# Patient Record
Sex: FEMALE | Race: BLACK OR AFRICAN AMERICAN | NOT HISPANIC OR LATINO | URBAN - METROPOLITAN AREA
[De-identification: names, ages, dates, MRNs, and addresses within clinical notes are randomized per-mention and may not be internally consistent; named-entity substitution may affect disease eponyms.]

---

## 2022-07-14 DIAGNOSIS — M25.571 RIGHT ANKLE PAIN, UNSPECIFIED CHRONICITY: Primary | ICD-10-CM

## 2022-07-21 ENCOUNTER — HOSPITAL ENCOUNTER (OUTPATIENT)
Dept: RADIOLOGY | Facility: HOSPITAL | Age: 32
Discharge: HOME | End: 2022-07-21
Attending: STUDENT IN AN ORGANIZED HEALTH CARE EDUCATION/TRAINING PROGRAM
Payer: MEDICAID

## 2022-07-21 ENCOUNTER — OFFICE VISIT (OUTPATIENT)
Dept: ORTHOPEDICS | Facility: CLINIC | Age: 32
End: 2022-07-21
Payer: MEDICAID

## 2022-07-21 VITALS
SYSTOLIC BLOOD PRESSURE: 108 MMHG | WEIGHT: 256 LBS | RESPIRATION RATE: 16 BRPM | OXYGEN SATURATION: 96 % | HEIGHT: 63 IN | HEART RATE: 93 BPM | DIASTOLIC BLOOD PRESSURE: 75 MMHG | BODY MASS INDEX: 45.36 KG/M2

## 2022-07-21 DIAGNOSIS — S93.491A SPRAIN OF ANTERIOR TALOFIBULAR LIGAMENT OF RIGHT ANKLE, INITIAL ENCOUNTER: Primary | ICD-10-CM

## 2022-07-21 DIAGNOSIS — M25.571 RIGHT ANKLE PAIN, UNSPECIFIED CHRONICITY: ICD-10-CM

## 2022-07-21 DIAGNOSIS — M76.829 POSTERIOR TIBIALIS TENDON INSUFFICIENCY: ICD-10-CM

## 2022-07-21 PROCEDURE — 99213 OFFICE O/P EST LOW 20 MIN: CPT | Mod: PBBFAC

## 2022-07-21 PROCEDURE — 73610 X-RAY EXAM OF ANKLE: CPT | Mod: TC,RT

## 2022-07-21 RX ORDER — MELOXICAM 7.5 MG/1
7.5 TABLET ORAL DAILY PRN
COMMUNITY
Start: 2022-07-11

## 2022-07-21 RX ORDER — ALBUTEROL SULFATE 0.63 MG/3ML
1 SOLUTION RESPIRATORY (INHALATION) EVERY 6 HOURS PRN
COMMUNITY

## 2022-07-21 NOTE — PROGRESS NOTES
Subjective:          Chief Complaint: Carmen Shen is a 31 y.o. female who had concerns including Pain of the Right Ankle.    HPI    31 y.o. female presents to clinic with complaint of Right Ankle pain.     Patient had had recurrent ankle sprains over the last several years.  Patient noted she had an inversion injury approximately 2 weeks ago and had an xray that showed no fracture at that time. She was referred here for further evaluation. No new falls or trauma since last seen. Has noted some improvement since injury.     Onset: as per hpi   Location: lateral   Mechanism of injury: Inversion  without Instability   Current pain level:  5/10 moderate  and quality described as Sharp that is Improved with anti-inflammatory medications.   Modifying factors: Worse with activity and improved on rest. Stiffness worsened with activity and immobilization    Previous Injury: as per hpi   Previous Tx: Has not tried Functional Ankle brace  Has not tried PT/HEP  Strengthening, ROM and Neuromuscular training exercises   Associated Sxs: Swelling only  No weakness. . No Decreased ROM     Activity level: Sedentary and Full ADLs   Family Hx: OA   PCP: Primary Doctor No   Employment: Employed full time      ROS  As per hpi               Objective:        Vitals:    07/21/22 1122   BP: 108/75   Pulse: 93   Resp: 16         General: Carmen is well-developed, well-nourished, appears stated age, in no acute distress, alert and oriented to time, place and person.             Right Ankle/Foot Exam     Swelling   The patient is swollen on the anterior talofibular ligament.    Tenderness   The patient is tender to palpation of the ATF.    Pain   The patient exhibits pain of the anterior talofibular ligament.    Range of Motion   The patient has normal right ankle ROM.    Alignment   Hindfoot Alignment: valgus  Midfoot Alignment: normal  Forefoot alignment: varus.    Muscle Strength   The patient has normal right ankle strength.    Tests    Anterior drawer: negative  Varus tilt: negative  Heel Walk: able to perform  Tiptoe Walk: able to perform  Single Heel Rise: able to perform  External Rotation Test: negative  Squeeze Test: negative    Other   Sensation: normal    Comments:  Able to do single heel raise but with difficulty.     Left Ankle/Foot Exam   Left ankle exam is normal.    Range of Motion   The patient has normal left ankle ROM.     Alignment   Hindfoot Alignment: neutral  Midfoot Alignment: normal  Forefoot Alignment: normal    Muscle Strength   The patient has normal left ankle strength.    Tests   Anterior drawer: negative  Varus tilt: negative  Heel Walk: able to perform  Tiptoe Walk: able to perform  Single Heel Rise: able to perform  External Rotation Test: negative  Squeeze Test: absent    Other   Sensation: normal      Xray of the right ankle from today with my interpration as follows: no acute fracture, dislocation or misalignment. tibiotalar and medial clear space normal. Soft tissue swelling along lateral malleolus.         Assessment:       Encounter Diagnoses   Name Primary?    Sprain of anterior talofibular ligament of right ankle, initial encounter Yes    Posterior tibialis tendon insufficiency            Plan:       Dx: Right ATFL sprain and posterior tibialis tendon insuffiency . Acute in moderate exacerbation. Discussed with patient diagnosis and treatment recommendations. Handout given.   Imaging: radiological studies ordered and independently reviewed; discussed with patient; radiologist interpretation pending .   Treatment Plan: Functional ankle brace. NSAIDs activity modification. PT x 12 weeks. Begin ROM and proprioceptive exercises when pain improves. Consider MRI of the ankle in 3 months if no improvement.   Weight Management: is paramount. As BMI is >35, recommend discuss with PCP about bariatric surgery options.   Procedure: Discussed CSI/VSI as treatment options; patient not a candidate for CSI/VSI.   Activity:  Activity as tolerated; HEP to include aerobic conditioning and strength training with non-painful activity. ROM/STG exercises. Proper footware; assistive devises to avoid limping.   Medication: First line treatment with daily Acetaminophen 1000mg TID; medication precautions given .   RTC: PRN; As needed.                        Patient questionnaires may have been collected.

## 2022-07-22 NOTE — PROGRESS NOTES
Faculty Attestation: Carmen Shen  was seen in [facility]. Discussed with Dr. Mckinley at the time of the visit. History of Present Illness, Physical Exam, and Assessment and Plan reviewed. Treatment plan is reasonable and appropriate. Compliance with treatment recommendations is important.  Radiology images independently reviewed and agree with fellow interpretation.  No procedure was performed.     David Bonilla MD

## 2025-04-02 ENCOUNTER — OFFICE VISIT (OUTPATIENT)
Dept: FAMILY MEDICINE | Facility: CLINIC | Age: 35
End: 2025-04-02
Payer: COMMERCIAL

## 2025-04-02 VITALS
TEMPERATURE: 98 F | BODY MASS INDEX: 39.69 KG/M2 | SYSTOLIC BLOOD PRESSURE: 108 MMHG | OXYGEN SATURATION: 99 % | WEIGHT: 224 LBS | RESPIRATION RATE: 18 BRPM | HEART RATE: 85 BPM | DIASTOLIC BLOOD PRESSURE: 75 MMHG | HEIGHT: 63 IN

## 2025-04-02 DIAGNOSIS — Z00.00 WELLNESS EXAMINATION: Primary | ICD-10-CM

## 2025-04-02 DIAGNOSIS — E66.812 CLASS 2 OBESITY DUE TO EXCESS CALORIES WITHOUT SERIOUS COMORBIDITY WITH BODY MASS INDEX (BMI) OF 39.0 TO 39.9 IN ADULT: ICD-10-CM

## 2025-04-02 DIAGNOSIS — J30.9 ALLERGIC RHINITIS, UNSPECIFIED SEASONALITY, UNSPECIFIED TRIGGER: ICD-10-CM

## 2025-04-02 DIAGNOSIS — R20.0 NUMBNESS AND TINGLING OF UPPER EXTREMITY: ICD-10-CM

## 2025-04-02 DIAGNOSIS — M79.604 BILATERAL LEG PAIN: ICD-10-CM

## 2025-04-02 DIAGNOSIS — E66.09 CLASS 2 OBESITY DUE TO EXCESS CALORIES WITHOUT SERIOUS COMORBIDITY WITH BODY MASS INDEX (BMI) OF 39.0 TO 39.9 IN ADULT: ICD-10-CM

## 2025-04-02 DIAGNOSIS — Z23 NEED FOR DIPHTHERIA-TETANUS-PERTUSSIS (TDAP) VACCINE: ICD-10-CM

## 2025-04-02 DIAGNOSIS — M79.605 BILATERAL LEG PAIN: ICD-10-CM

## 2025-04-02 DIAGNOSIS — R41.840 ATTENTION AND CONCENTRATION DEFICIT: ICD-10-CM

## 2025-04-02 DIAGNOSIS — R20.2 NUMBNESS AND TINGLING OF UPPER EXTREMITY: ICD-10-CM

## 2025-04-02 DIAGNOSIS — K21.9 GASTROESOPHAGEAL REFLUX DISEASE, UNSPECIFIED WHETHER ESOPHAGITIS PRESENT: ICD-10-CM

## 2025-04-02 DIAGNOSIS — J45.20 MILD INTERMITTENT ASTHMA WITHOUT COMPLICATION: ICD-10-CM

## 2025-04-02 RX ORDER — TIRZEPATIDE 7.5 MG/.5ML
7.5 INJECTION, SOLUTION SUBCUTANEOUS
Qty: 2 ML | Refills: 2 | Status: SHIPPED | OUTPATIENT
Start: 2025-04-02 | End: 2025-07-01

## 2025-04-02 RX ORDER — CETIRIZINE HYDROCHLORIDE 10 MG/1
1 TABLET ORAL EVERY MORNING
COMMUNITY

## 2025-04-02 RX ORDER — ONDANSETRON 4 MG/1
4 TABLET, ORALLY DISINTEGRATING ORAL EVERY 6 HOURS PRN
COMMUNITY
Start: 2025-03-16

## 2025-04-02 RX ORDER — ASPIRIN 325 MG
50000 TABLET, DELAYED RELEASE (ENTERIC COATED) ORAL
COMMUNITY
Start: 2025-03-16

## 2025-04-02 RX ORDER — OMEPRAZOLE 40 MG/1
40 CAPSULE, DELAYED RELEASE ORAL 2 TIMES DAILY
COMMUNITY

## 2025-04-02 RX ORDER — BUDESONIDE AND FORMOTEROL FUMARATE DIHYDRATE 160; 4.5 UG/1; UG/1
2 AEROSOL RESPIRATORY (INHALATION) 2 TIMES DAILY
COMMUNITY

## 2025-04-02 RX ORDER — BUSPIRONE HYDROCHLORIDE 5 MG/1
5 TABLET ORAL 2 TIMES DAILY
COMMUNITY

## 2025-04-02 NOTE — LETTER
AUTHORIZATION FOR RELEASE OF   CONFIDENTIAL INFORMATION    Dear Dr. guardado,    We are seeing Carmen Shen, date of birth 1990, in the clinic at No Department Specified. No, Primary Doctor is the patient's PCP. Carmen Shen has an outstanding lab/procedure at the time we reviewed her chart. In order to help keep her health information updated, she has authorized us to request the following medical record(s):        (  )  MAMMOGRAM                                      (  )  COLONOSCOPY      ( xx )  PAP SMEAR                                          (  )  OUTSIDE LAB RESULTS     (  )  DEXA SCAN                                          (  )  EYE EXAM            (  )  FOOT EXAM                                          (  )  ENTIRE RECORD     (  )  OUTSIDE IMMUNIZATIONS                 (  )  _______________         Please fax records to [facility], Cassandra Pillette MD, [phone]     If you have any questions, please contact Jessie Cisse LPN at [phone].           Patient Name: Carmen Shen  : 1990  Patient Phone #: [phone]

## 2025-04-02 NOTE — PROGRESS NOTES
Patient ID: [id number]     Chief Complaint: Establish Care and Annual Exam        HPI:     Carmen Shen is a 34 y.o. female here today to establish care with PCP and for annual wellness exam.  The patient presents for well adult exam.    The patient's general health status is described as good.    The patient's diet is described as balanced.    Exercise: occasional.    Associated symptoms consist of denies weight loss, denies weight gain, denies fatigue, denies headache, denies hearing loss and denies vision changes.    Additional pertinent history: last dental exam: > 6 months (she has dental insurance, she will schedule an appointment next available, last eye exam: > 1 year (wears eyeglasses, she needs referral for eye exam) Denies tobacco use. Social alcohol use.   She is due for annual labs today. She would like HIV and Hep C screening.   LMP: 03/26/2025, regular.  Cervical Cancer Screening - Last Pap 2024. Follow up with GYN Clinic (Dr. Dale in [place]) for Pap/Pelvic.    Vaccinations: COVID- Not interested / Tetanus - Would like/ Flu vaccine- not interested    - She is obese and has lost 60 pounds with compound tirzepatide 7.5 mg weekly, no side effects, would like to see if her insurance covers the brand name, she reports mild snoring, no witnessed apnea, she is not interested in seeing a dietician.     - GERD is controlled with Rx, followed by GI at [facility], asymptomatic.     - Mild intermittent asthma, asymptomatic, stable with Rx, no side effects, she has Rx at home, no asthma attack in over 1 year.     - She has allergic rhinitis, stable with Rx, would like allergy testing done.     - Anxiety is stable with Rx Buspirone p.r.n., no side effects, asymptomatic.     - She reports difficulty focusing and concentrating on tasks at work and at home x several years, she would like testing for ADD.     - She reports bilateral leg pain and cramping x several months, would like venous testing,  denies injury other than a TV falling on her right leg 2 weeks ago.     - She reports bilateral UE paraesthesia x several months, she is right hand dominant, she works as [employer], she has had XR C-spine with previous provider that was normal, she hasn't had UE NCS/EMG.     - Patient is without any other complaints today.       Advance Care Planning     Date: 04/02/2025  Patient did not wish or was not able to name a surrogate decision maker or provide an Advance Care Plan.        -------------------------------------    GERD (gastroesophageal reflux disease)    Known health problems: none        Past Surgical History:   Procedure Laterality Date    NO PAST SURGERIES         Review of patient's allergies indicates:  No Known Allergies    Outpatient Medications Marked as Taking for the 4/2/25 encounter (Office Visit) with Luana Plummer MD   Medication Sig Dispense Refill    albuterol (ACCUNEB) 0.63 mg/3 mL Nebu Inhale 1 ampule into the lungs every 6 (six) hours as needed.      budesonide-formoterol 160-4.5 mcg (SYMBICORT) 160-4.5 mcg/actuation HFAA Inhale 2 puffs into the lungs 2 (two) times daily.      busPIRone (BUSPAR) 5 MG Tab Take 5 mg by mouth 2 (two) times daily. (Patient taking differently: Take 5 mg by mouth 2 (two) times daily as needed.)      cetirizine (ZYRTEC) 10 MG tablet Take 1 tablet by mouth every morning.      cholecalciferol, vitamin D3, 1,250 mcg (50,000 unit) capsule Take 50,000 Units by mouth every 7 days.      meloxicam (MOBIC) 7.5 MG tablet Take 7.5 mg by mouth daily as needed.      omeprazole (PRILOSEC) 40 MG capsule Take 40 mg by mouth 2 (two) times daily.      ondansetron (ZOFRAN-ODT) 4 MG TbDL Take 4 mg by mouth every 6 (six) hours as needed.       Current Facility-Administered Medications for the 4/2/25 encounter (Office Visit) with Luana Plummer MD   Medication Dose Route Frequency Provider Last Rate Last Admin    DIPH,PERTUSS(ACEL),TET VAC(PF)(ADULT)(ADACEL)(TDaP)  0.5 mL  "Intramuscular 1 time in Clinic/HOD            Social History[1]     Family History   Problem Relation Name Age of Onset    No Known Problems Mother      Diabetes Father      No Known Problems Sister      No Known Problems Brother          Subjective:       Review of Systems:    See HPI for details    Constitutional: No fever, No chills, No fatigue.   Eye: No blurring, No visual disturbances.   Ear/Nose/Mouth/Throat: No decreased hearing, No ear pain, No nasal congestion, No sore throat.   Respiratory: No shortness of breath, No cough, No wheezing.   Cardiovascular: No chest pain, No palpitations, No peripheral edema.   Breast: Both breasts, No lump/ mass, No pain.   Nipple discharge: None.   Gastrointestinal: No nausea, No vomiting, No diarrhea, No constipation, No abdominal pain.   Genitourinary: No dysuria, No hematuria.   Gynecologic: Negative except as documented in history of present illness.   Hematology/Lymphatics: No bruising tendency, No bleeding tendency, No swollen lymph glands.   Endocrine: No excessive thirst, No polyuria, No excessive hunger.   Musculoskeletal: Reports joint pain, No muscle pain, No decreased range of motion.   Integumentary: No rash, No pruritus.   Neurologic: No abnormal balance, No confusion, No headache.   Psychiatric: As per HPI. No sleeping problems, No anxiety, No depression, Not suicidal, No hallucinations      All Other ROS: Negative except as stated in HPI.       Objective:     /75 (BP Location: Right arm, Patient Position: Sitting)   Pulse 85   Temp 97.8 °F (36.6 °C) (Oral)   Resp 18   Ht 5' 3" (1.6 m)   Wt 101.6 kg (224 lb)   LMP 03/26/2025 (Approximate)   SpO2 99%   BMI 39.68 kg/m²     Physical Exam    General: Alert and oriented, No acute distress.   Appearance: Obese.   Eye: Pupils are equal, round and reactive to light, Normal conjunctiva.   HENT: Normocephalic, Tympanic membranes are clear, Normal hearing, Oral mucosa is moist, No pharyngeal erythema. "   Throat: Pharynx ( Not edematous, No exudate ).   Neck: Supple, Non-tender, No carotid bruit, No lymphadenopathy, No thyromegaly.   Respiratory: Lungs are clear to auscultation, Respirations are non-labored, Breath sounds are equal, Symmetrical chest wall expansion, No chest wall tenderness.   Cardiovascular: Normal rate, Regular rhythm, No murmur, Good pulses equal in all extremities, No edema.   Gastrointestinal: Soft, Non-tender, Non-distended, Normal bowel sounds, No organomegaly, Abdomen.   Genitourinary: No costovertebral angle tenderness.   Musculoskeletal: Normal range of motion, No tenderness, Normal gait.   Neurologic: No focal deficits, Cranial Nerves II-XII are grossly intact.   Psychiatric: Cooperative, Appropriate mood & affect, Normal judgment, Non-suicidal.   Mood and affect: Calm.   Behavior: Relaxed         Assessment:       ICD-10-CM ICD-9-CM   1. Wellness examination  Z00.00 V70.0   2. Need for diphtheria-tetanus-pertussis (Tdap) vaccine  Z23 V06.1   3. Class 2 obesity due to excess calories without serious comorbidity with body mass index (BMI) of 39.0 to 39.9 in adult  E66.812 278.00    E66.09 V85.39    Z68.39    4. Gastroesophageal reflux disease, unspecified whether esophagitis present  K21.9 530.81   5. Mild intermittent asthma without complication  J45.20 493.90   6. Allergic rhinitis, unspecified seasonality, unspecified trigger  J30.9 477.9   7. Attention and concentration deficit  R41.840 799.51   8. Bilateral leg pain  M79.604 729.5    M79.605    9. Numbness and tingling of upper extremity  R20.0 782.0    R20.2         Plan:     Problem List Items Addressed This Visit          ENT    Allergic rhinitis    Relevant Orders    Allergen Respiratory Panel IgE - Region 6       Pulmonary    Mild intermittent asthma without complication       Endocrine    Class 2 obesity due to excess calories without serious comorbidity with body mass index (BMI) of 39.0 to 39.9 in adult    Relevant  Medications    tirzepatide, weight loss, (ZEPBOUND) 7.5 mg/0.5 mL PnIj    Other Relevant Orders    Vitamin D       GI    Gastroesophageal reflux disease     Other Visit Diagnoses         Wellness examination    -  Primary    Relevant Orders    Ambulatory referral/consult to Ophthalmology    CBC Auto Differential    Comprehensive Metabolic Panel    Hemoglobin A1C    Lipid Panel    TSH    Hepatitis C Antibody    HIV 1/2 Ag/Ab (4th Gen)    Urinalysis, Reflex to Urine Culture    Vitamin B12    Iron and TIBC    Ferritin    Folate      Need for diphtheria-tetanus-pertussis (Tdap) vaccine        Relevant Medications    DIPH,PERTUSS(ACEL),TET VAC(PF)(ADULT)(ADACEL)(TDaP) (Start on 4/2/2025 10:15 AM)      Attention and concentration deficit        Relevant Orders    Ambulatory referral/consult to Psychiatry      Bilateral leg pain        Relevant Orders    CV Ultrasound doppler venous legs bilat    CV US Lower Extremity Veins Bilateral Insufficiency    CV Ultrasound doppler arterial legs bilat      Numbness and tingling of upper extremity        Relevant Orders    Vitamin B12    Ambulatory referral/consult to Neurology         1. Wellness examination  - Ambulatory referral/consult to Ophthalmology; Future  - CBC Auto Differential; Future  - Comprehensive Metabolic Panel; Future  - Hemoglobin A1C; Future  - Lipid Panel; Future  - TSH; Future  - Hepatitis C Antibody; Future  - HIV 1/2 Ag/Ab (4th Gen); Future  - Urinalysis, Reflex to Urine Culture; Future  - Vitamin B12; Future  - Iron and TIBC; Future  - Ferritin; Future  - Folate; Future  - Will treat pending lab results. Monthly breast self exam encouraged. Diet, exercise, and 10% weight loss encouraged. Keep appointment for dental exams x q6 months as scheduled. Keep appointment for annual eye exam as scheduled. Keep appointment with GYN for annual pap smear as scheduled. Notify M.D. or ER if temp greater than 100.4, or any acute illness.      2. Need for  diphtheria-tetanus-pertussis (Tdap) vaccine  - DIPH,PERTUSS(ACEL),TET VAC(PF)(ADULT)(ADACEL)(TDaP) IM x 1 today.    3. Class 2 obesity due to excess calories without serious comorbidity with body mass index (BMI) of 39.0 to 39.9 in adult  - Vitamin D; Future  - Rx trial of tirzepatide, weight loss, (ZEPBOUND) 7.5 mg/0.5 mL PnIj; Inject 7.5 mg into the skin every 7 days.  Dispense: 2 mL; Refill: 2  - Diet, exercise, and 10% weight loss encouraged. Rx trial of Zepbound with close monitoring to help with obesity. She agrees to avoid pregnancy with Mounjaro. Will titrate Rx as needed/tolerated until max dose achieved. Notify M.D. or ER if symptoms persist or worsen, adverse Rx side effects, temp greater than 100.4, or any acute illness.      4. Gastroesophageal reflux disease, unspecified whether esophagitis present  - Stable, continue Prilosec and GERD dietary precautions.     5. Mild intermittent asthma without complication  - Asymptomatic, continue Symbicort daily and Albuterol p.r.n. Notify M.D. or ER if symptoms persist or worsen, SOB, chest tightness, asthma attack refractory to rescue inhaler, temp >100.4, or any acute illness.      6. Allergic rhinitis, unspecified seasonality, unspecified trigger  - Allergen Respiratory Panel IgE - Region 6; Future  - Continue Zyrtec. Will treat pending results.     7. Attention and concentration deficit  - Ambulatory referral/consult to Psychiatry; Future for ADD evaluation, will treat pending results.     8. Bilateral leg pain  - CV Ultrasound doppler venous legs bilat; Future  - CV US Lower Extremity Veins Bilateral Insufficiency; Future  - CV Ultrasound doppler arterial legs bilat; Future  - Will treat pending results.     9. Numbness and tingling of upper extremity  - Vitamin B12; Future  - Ambulatory referral/consult to Neurology; Future for evaluation for UE NCS/EMG; wear wrist splints/braces at night.         Carmen was seen today for establish care and annual  exam.    Diagnoses and all orders for this visit:    Wellness examination  -     Ambulatory referral/consult to Ophthalmology; Future  -     CBC Auto Differential; Future  -     Comprehensive Metabolic Panel; Future  -     Hemoglobin A1C; Future  -     Lipid Panel; Future  -     TSH; Future  -     Hepatitis C Antibody; Future  -     HIV 1/2 Ag/Ab (4th Gen); Future  -     Urinalysis, Reflex to Urine Culture; Future  -     Vitamin B12; Future  -     Iron and TIBC; Future  -     Ferritin; Future  -     Folate; Future    Need for diphtheria-tetanus-pertussis (Tdap) vaccine  -     DIPH,PERTUSS(ACEL),TET VAC(PF)(ADULT)(ADACEL)(TDaP)    Class 2 obesity due to excess calories without serious comorbidity with body mass index (BMI) of 39.0 to 39.9 in adult  -     Vitamin D; Future  -     tirzepatide, weight loss, (ZEPBOUND) 7.5 mg/0.5 mL PnIj; Inject 7.5 mg into the skin every 7 days.    Gastroesophageal reflux disease, unspecified whether esophagitis present    Mild intermittent asthma without complication    Allergic rhinitis, unspecified seasonality, unspecified trigger  -     Allergen Respiratory Panel IgE - Region 6; Future    Attention and concentration deficit  -     Ambulatory referral/consult to Psychiatry; Future    Bilateral leg pain  -     CV Ultrasound doppler venous legs bilat; Future  -     CV US Lower Extremity Veins Bilateral Insufficiency; Future  -     CV Ultrasound doppler arterial legs bilat; Future    Numbness and tingling of upper extremity  -     Vitamin B12; Future  -     Ambulatory referral/consult to Neurology; Future          Medication List with Changes/Refills   New Medications    TIRZEPATIDE, WEIGHT LOSS, (ZEPBOUND) 7.5 MG/0.5 ML PNIJ    Inject 7.5 mg into the skin every 7 days.       Start Date: 4/2/2025  End Date: 7/1/2025   Current Medications    ALBUTEROL (ACCUNEB) 0.63 MG/3 ML NEBU    Inhale 1 ampule into the lungs every 6 (six) hours as needed.       Start Date: --        End Date: --     BUDESONIDE-FORMOTEROL 160-4.5 MCG (SYMBICORT) 160-4.5 MCG/ACTUATION HFAA    Inhale 2 puffs into the lungs 2 (two) times daily.       Start Date: --        End Date: --    BUSPIRONE (BUSPAR) 5 MG TAB    Take 5 mg by mouth 2 (two) times daily.       Start Date: --        End Date: --    CETIRIZINE (ZYRTEC) 10 MG TABLET    Take 1 tablet by mouth every morning.       Start Date: --        End Date: --    CHOLECALCIFEROL, VITAMIN D3, 1,250 MCG (50,000 UNIT) CAPSULE    Take 50,000 Units by mouth every 7 days.       Start Date: 3/16/2025 End Date: --    MELOXICAM (MOBIC) 7.5 MG TABLET    Take 7.5 mg by mouth daily as needed.       Start Date: 7/11/2022 End Date: --    OMEPRAZOLE (PRILOSEC) 40 MG CAPSULE    Take 40 mg by mouth 2 (two) times daily.       Start Date: --        End Date: --    ONDANSETRON (ZOFRAN-ODT) 4 MG TBDL    Take 4 mg by mouth every 6 (six) hours as needed.       Start Date: 3/16/2025 End Date: --   Discontinued Medications    TIRZEPATIDE 7.5 MG/0.5 ML PNIJ    Inject 7.5 mg into the skin every 7 days.       Start Date: --        End Date: 4/2/2025          Follow up in about 3 months (around 7/2/2025) for Obesity Followup, Virtual Visit; 1 year for wellness.          [1]   Social History  Socioeconomic History    Marital status: Single   Tobacco Use    Smoking status: Never    Smokeless tobacco: Never   Substance and Sexual Activity    Alcohol use: Yes     Alcohol/week: 1.0 standard drink of alcohol     Types: 1 Glasses of wine per week     Comment: 1-2 month    Drug use: Never    Sexual activity: Yes     Social Drivers of Health     Financial Resource Strain: Low Risk  (4/1/2025)    Overall Financial Resource Strain (CARDIA)     Difficulty of Paying Living Expenses: Not very hard   Food Insecurity: No Food Insecurity (4/1/2025)    Hunger Vital Sign     Worried About Running Out of Food in the Last Year: Never true     Ran Out of Food in the Last Year: Never true   Transportation Needs: No Transportation  Needs (4/1/2025)    PRAPARE - Transportation     Lack of Transportation (Medical): No     Lack of Transportation (Non-Medical): No   Physical Activity: Insufficiently Active (4/1/2025)    Exercise Vital Sign     Days of Exercise per Week: 3 days     Minutes of Exercise per Session: 30 min   Stress: Stress Concern Present (4/1/2025)    [employer] [place] - Occupational Stress Questionnaire     Feeling of Stress : To some extent   Housing Stability: Low Risk  (4/1/2025)    Housing Stability Vital Sign     Unable to Pay for Housing in the Last Year: No     Number of Times Moved in the Last Year: 0     Homeless in the Last Year: No

## 2025-04-09 ENCOUNTER — LAB VISIT (OUTPATIENT)
Dept: LAB | Facility: HOSPITAL | Age: 35
End: 2025-04-09
Attending: FAMILY MEDICINE
Payer: COMMERCIAL

## 2025-04-09 ENCOUNTER — RESULTS FOLLOW-UP (OUTPATIENT)
Dept: FAMILY MEDICINE | Facility: CLINIC | Age: 35
End: 2025-04-09

## 2025-04-09 DIAGNOSIS — E66.09 CLASS 2 OBESITY DUE TO EXCESS CALORIES WITHOUT SERIOUS COMORBIDITY WITH BODY MASS INDEX (BMI) OF 39.0 TO 39.9 IN ADULT: ICD-10-CM

## 2025-04-09 DIAGNOSIS — R20.0 NUMBNESS AND TINGLING OF UPPER EXTREMITY: ICD-10-CM

## 2025-04-09 DIAGNOSIS — J30.9 ALLERGIC RHINITIS, UNSPECIFIED SEASONALITY, UNSPECIFIED TRIGGER: ICD-10-CM

## 2025-04-09 DIAGNOSIS — I87.2 VENOUS INSUFFICIENCY OF RIGHT LOWER EXTREMITY: ICD-10-CM

## 2025-04-09 DIAGNOSIS — E61.1 IRON DEFICIENCY: Primary | ICD-10-CM

## 2025-04-09 DIAGNOSIS — E66.812 CLASS 2 OBESITY DUE TO EXCESS CALORIES WITHOUT SERIOUS COMORBIDITY WITH BODY MASS INDEX (BMI) OF 39.0 TO 39.9 IN ADULT: ICD-10-CM

## 2025-04-09 DIAGNOSIS — Z00.00 WELLNESS EXAMINATION: ICD-10-CM

## 2025-04-09 DIAGNOSIS — R20.2 NUMBNESS AND TINGLING OF UPPER EXTREMITY: ICD-10-CM

## 2025-04-09 LAB
25(OH)D3+25(OH)D2 SERPL-MCNC: 48 NG/ML (ref 30–80)
ALBUMIN SERPL-MCNC: 3.7 G/DL (ref 3.5–5)
ALBUMIN/GLOB SERPL: 1.1 RATIO (ref 1.1–2)
ALLERGEN ALTERNARIA ALTERNATA IGE (OLG): <0.1 KUA/L
ALLERGEN ASPERGILLUS FUMIGATUS IGE (OLG): <0.1 KUA/L
ALLERGEN BERMUDA GRASS IGE (OLG): <0.1 KUA/L
ALLERGEN BOXELDER MAPLE TREE IGE (OLG): <0.1 KUA/L
ALLERGEN CAT DANDER IGE (OLG): <0.1 KUA/L
ALLERGEN CLADOSPORIUM HERBARUM IGE (OLG): <0.1 KUA/L
ALLERGEN COCKROACH GERMAN IGE (OLG): <0.1 KUA/L
ALLERGEN COMMON RAGWEED IGE (OLG): <0.1 KUA/L
ALLERGEN DOG DANDER IGE (OLG): <0.1 KUA/L
ALLERGEN DUST MITE (D. PTERONYSSINUS) IGE (OLG): 1.12 KUA/L
ALLERGEN DUST MITE (D.FARINAE) IGE (OLG): 1.04 KUA/L
ALLERGEN ELM TREE IGE (OLG): <0.1 KUA/L
ALLERGEN HORSE DANDER IGE (OLG): <0.1 KUA/L
ALLERGEN MOUNTAIN JUNIPER TREE IGE (OLG): <0.1 KUA/L
ALLERGEN MOUSE URINE PROTEINS IGE (OLG): <0.1 KUA/L
ALLERGEN MULBERRY TREE IGE (OLG): <0.1 KUA/L
ALLERGEN OAK TREE IGE (OLG): <0.1 KUA/L
ALLERGEN PECAN HICKORY TREE IGE (OLG): <0.1 KUA/L
ALLERGEN PENICILLIUM CHRYSOGENUM IGE (OLG): <0.1 KUA/L
ALLERGEN PIGWEED IGE (OLG): <0.1 KUA/L
ALLERGEN ROUGH MARSH ELDER IGE (OLG): <0.1 KUA/L
ALLERGEN SILVER BIRCH TREE IGE (OLG): <0.1 KUA/L
ALLERGEN TIMOTHY GRASS IGE (OLG): <0.1 KUA/L
ALLERGEN WALNUT TREE IGE (OLG): <0.1 KUA/L
ALP SERPL-CCNC: 57 UNIT/L (ref 40–150)
ALT SERPL-CCNC: 14 UNIT/L (ref 0–55)
ANION GAP SERPL CALC-SCNC: 4 MEQ/L
AST SERPL-CCNC: 12 UNIT/L (ref 11–45)
BASOPHILS # BLD AUTO: 0.05 X10(3)/MCL
BASOPHILS NFR BLD AUTO: 0.6 %
BILIRUB SERPL-MCNC: 0.2 MG/DL
BILIRUB UR QL STRIP.AUTO: NEGATIVE
BUN SERPL-MCNC: 13.3 MG/DL (ref 7–18.7)
CALCIUM SERPL-MCNC: 9.1 MG/DL (ref 8.4–10.2)
CHLORIDE SERPL-SCNC: 111 MMOL/L (ref 98–107)
CHOLEST SERPL-MCNC: 148 MG/DL
CHOLEST/HDLC SERPL: 3 {RATIO} (ref 0–5)
CLARITY UR: ABNORMAL
CO2 SERPL-SCNC: 25 MMOL/L (ref 22–29)
COLOR UR AUTO: ABNORMAL
CREAT SERPL-MCNC: 0.78 MG/DL (ref 0.55–1.02)
CREAT/UREA NIT SERPL: 17
EOSINOPHIL # BLD AUTO: 0.45 X10(3)/MCL (ref 0–0.9)
EOSINOPHIL NFR BLD AUTO: 5.5 %
ERYTHROCYTE [DISTWIDTH] IN BLOOD BY AUTOMATED COUNT: 13.5 % (ref 11.5–17)
EST. AVERAGE GLUCOSE BLD GHB EST-MCNC: 96.8 MG/DL
FERRITIN SERPL-MCNC: 152.86 NG/ML (ref 4.63–204)
FOLATE SERPL-MCNC: 7.9 NG/ML (ref 7–31.4)
GFR SERPLBLD CREATININE-BSD FMLA CKD-EPI: >60 ML/MIN/1.73/M2
GLOBULIN SER-MCNC: 3.3 GM/DL (ref 2.4–3.5)
GLUCOSE SERPL-MCNC: 97 MG/DL (ref 74–100)
GLUCOSE UR QL STRIP: NEGATIVE
HBA1C MFR BLD: 5 %
HCT VFR BLD AUTO: 37.5 % (ref 37–47)
HCV AB SERPL QL IA: NONREACTIVE
HDLC SERPL-MCNC: 44 MG/DL (ref 35–60)
HGB BLD-MCNC: 12.1 G/DL (ref 12–16)
HGB UR QL STRIP: NEGATIVE
HIV 1+2 AB+HIV1 P24 AG SERPL QL IA: NONREACTIVE
IMM GRANULOCYTES # BLD AUTO: 0.02 X10(3)/MCL (ref 0–0.04)
IMM GRANULOCYTES NFR BLD AUTO: 0.2 %
IRON SATN MFR SERPL: 21 % (ref 20–50)
IRON SERPL-MCNC: 48 UG/DL (ref 50–170)
KETONES UR QL STRIP: NEGATIVE
LDLC SERPL CALC-MCNC: 94 MG/DL (ref 50–140)
LEUKOCYTE ESTERASE UR QL STRIP: NEGATIVE
LYMPHOCYTES # BLD AUTO: 2.31 X10(3)/MCL (ref 0.6–4.6)
LYMPHOCYTES NFR BLD AUTO: 28 %
MCH RBC QN AUTO: 27.6 PG (ref 27–31)
MCHC RBC AUTO-ENTMCNC: 32.3 G/DL (ref 33–36)
MCV RBC AUTO: 85.4 FL (ref 80–94)
MONOCYTES # BLD AUTO: 0.44 X10(3)/MCL (ref 0.1–1.3)
MONOCYTES NFR BLD AUTO: 5.3 %
NEUTROPHILS # BLD AUTO: 4.97 X10(3)/MCL (ref 2.1–9.2)
NEUTROPHILS NFR BLD AUTO: 60.4 %
NITRITE UR QL STRIP: NEGATIVE
NRBC BLD AUTO-RTO: 0 %
PH UR STRIP: 6 [PH]
PHADIOTOP IGE QN: 125 KU/L
PLATELET # BLD AUTO: 241 X10(3)/MCL (ref 130–400)
PMV BLD AUTO: 12.3 FL (ref 7.4–10.4)
POTASSIUM SERPL-SCNC: 4.2 MMOL/L (ref 3.5–5.1)
PROT SERPL-MCNC: 7 GM/DL (ref 6.4–8.3)
PROT UR QL STRIP: NEGATIVE
RBC # BLD AUTO: 4.39 X10(6)/MCL (ref 4.2–5.4)
SODIUM SERPL-SCNC: 140 MMOL/L (ref 136–145)
SP GR UR STRIP.AUTO: >=1.03 (ref 1–1.03)
TIBC SERPL-MCNC: 181 UG/DL (ref 70–310)
TIBC SERPL-MCNC: 229 UG/DL (ref 250–450)
TRANSFERRIN SERPL-MCNC: 194 MG/DL (ref 180–382)
TRIGL SERPL-MCNC: 49 MG/DL (ref 37–140)
TSH SERPL-ACNC: 1.42 UIU/ML (ref 0.35–4.94)
UROBILINOGEN UR STRIP-ACNC: 0.2
VIT B12 SERPL-MCNC: 1605 PG/ML (ref 213–816)
VLDLC SERPL CALC-MCNC: 10 MG/DL
WBC # BLD AUTO: 8.24 X10(3)/MCL (ref 4.5–11.5)

## 2025-04-09 PROCEDURE — 84443 ASSAY THYROID STIM HORMONE: CPT

## 2025-04-09 PROCEDURE — 82728 ASSAY OF FERRITIN: CPT

## 2025-04-09 PROCEDURE — 36415 COLL VENOUS BLD VENIPUNCTURE: CPT

## 2025-04-09 PROCEDURE — 82607 VITAMIN B-12: CPT

## 2025-04-09 PROCEDURE — 85025 COMPLETE CBC W/AUTO DIFF WBC: CPT

## 2025-04-09 PROCEDURE — 86803 HEPATITIS C AB TEST: CPT

## 2025-04-09 PROCEDURE — 82785 ASSAY OF IGE: CPT

## 2025-04-09 PROCEDURE — 83550 IRON BINDING TEST: CPT

## 2025-04-09 PROCEDURE — 82746 ASSAY OF FOLIC ACID SERUM: CPT

## 2025-04-09 PROCEDURE — 80061 LIPID PANEL: CPT

## 2025-04-09 PROCEDURE — 80053 COMPREHEN METABOLIC PANEL: CPT

## 2025-04-09 PROCEDURE — 83036 HEMOGLOBIN GLYCOSYLATED A1C: CPT

## 2025-04-09 PROCEDURE — 82306 VITAMIN D 25 HYDROXY: CPT

## 2025-04-09 PROCEDURE — 81003 URINALYSIS AUTO W/O SCOPE: CPT

## 2025-04-09 PROCEDURE — 87389 HIV-1 AG W/HIV-1&-2 AB AG IA: CPT

## 2025-04-09 RX ORDER — FERROUS GLUCONATE 324(38)MG
324 TABLET ORAL 2 TIMES DAILY WITH MEALS
Qty: 60 TABLET | Refills: 1 | Status: SHIPPED | OUTPATIENT
Start: 2025-04-09 | End: 2025-06-08

## 2025-04-23 ENCOUNTER — HOSPITAL ENCOUNTER (OUTPATIENT)
Dept: RADIOLOGY | Facility: HOSPITAL | Age: 35
Discharge: HOME | End: 2025-04-23
Attending: FAMILY MEDICINE
Payer: COMMERCIAL

## 2025-04-23 ENCOUNTER — RESULTS FOLLOW-UP (OUTPATIENT)
Dept: FAMILY MEDICINE | Facility: CLINIC | Age: 35
End: 2025-04-23

## 2025-04-23 ENCOUNTER — PATIENT MESSAGE (OUTPATIENT)
Dept: FAMILY MEDICINE | Facility: CLINIC | Age: 35
End: 2025-04-23
Payer: COMMERCIAL

## 2025-04-23 DIAGNOSIS — M79.604 BILATERAL LEG PAIN: ICD-10-CM

## 2025-04-23 DIAGNOSIS — M79.605 BILATERAL LEG PAIN: ICD-10-CM

## 2025-04-23 PROBLEM — I87.2 VENOUS INSUFFICIENCY OF RIGHT LOWER EXTREMITY: Status: ACTIVE | Noted: 2025-04-23

## 2025-04-23 LAB
IMMEDIATE ARM BP: 134 MMHG
IMMEDIATE LEFT ABI: 0.9
IMMEDIATE LEFT TIBIAL: 121 MMHG
IMMEDIATE RIGHT ABI: 0.97
IMMEDIATE RIGHT TIBIAL: 130 MMHG
LEFT ABI: 1
LEFT ARM BP: 123 MMHG
LEFT CFA PSV: 94 CM/S
LEFT DORSALIS PEDIS PSV: 47 CM/S
LEFT DORSALIS PEDIS: 86 MMHG
LEFT GREAT SAPHENOUS DISTAL THIGH DIA: 0.36 CM
LEFT GREAT SAPHENOUS JUNCTION DIA: 1.02 CM
LEFT GREAT SAPHENOUS KNEE DIA: 0.39 CM
LEFT GREAT SAPHENOUS MIDDLE THIGH DIA: 0.7 CM
LEFT GREAT SAPHENOUS PROXIMAL CALF DIA: 0.17 CM
LEFT PERONEAL SYS PSV: 29 CM/S
LEFT POPLITEAL PSV: 57 CM/S
LEFT POST TIBIAL SYS PSV: 76 CM/S
LEFT POSTERIOR TIBIAL: 124 MMHG
LEFT PROFUNDA SYS PSV: 88 CM/S
LEFT SMALL SAPHENOUS KNEE DIA: 0.27 CM
LEFT SMALL SAPHENOUS SPJ DIA: 0.27 CM
LEFT SUPER FEMORAL DIST SYS PSV: 76 CM/S
LEFT SUPER FEMORAL MID SYS PSV: 68 CM/S
LEFT SUPER FEMORAL PROX SYS PSV: 81 CM/S
OHS CV LEFT LOWER EXTREMITY ABI (NO CALC): 1
OHS CV RIGHT ABI LOWER EXTREMITY (NO CALC): 1.01
RIGHT ABI: 1.01
RIGHT ARM BP: 124 MMHG
RIGHT CFA PSV: 122 CM/S
RIGHT DORSALIS PEDIS PSV: 21 CM/S
RIGHT DORSALIS PEDIS: 120 MMHG
RIGHT GREAT SAPHENOUS DISTAL THIGH DIA: 0.44 CM
RIGHT GREAT SAPHENOUS JUNCTION DIA: 1.13 CM
RIGHT GREAT SAPHENOUS KNEE DIA: 0.47 CM
RIGHT GREAT SAPHENOUS MIDDLE THIGH DIA: 0.7 CM
RIGHT GREAT SAPHENOUS PROXIMAL CALF DIA: 0.21 CM
RIGHT GREAT SAPHENOUS PROXIMAL CALF REFLUX: 1410 MS
RIGHT PERONEAL SYS PSV: 32 CM/S
RIGHT POPLITEAL PSV: 42 CM/S
RIGHT POST TIBIAL SYS PSV: 54 CM/S
RIGHT POSTERIOR TIBIAL: 125 MMHG
RIGHT PROFUNDA SYS PSV: 44 CM/S
RIGHT SMALL SAPHENOUS KNEE DIA: 0.26 CM
RIGHT SMALL SAPHENOUS SPJ DIA: 0.17 CM
RIGHT SUPER FEMORAL DIST SYS PSV: 60 CM/S
RIGHT SUPER FEMORAL MID SYS PSV: 68 CM/S
RIGHT SUPER FEMORAL PROX SYS PSV: 46 CM/S

## 2025-04-23 PROCEDURE — 93970 EXTREMITY STUDY: CPT | Mod: TC

## 2025-04-23 PROCEDURE — 93924 LWR XTR VASC STDY BILAT: CPT

## 2025-04-23 PROCEDURE — 93925 LOWER EXTREMITY STUDY: CPT

## 2025-04-29 DIAGNOSIS — M79.601 PARESTHESIA AND PAIN OF BOTH UPPER EXTREMITIES: Primary | ICD-10-CM

## 2025-04-29 DIAGNOSIS — R20.2 PARESTHESIA AND PAIN OF BOTH UPPER EXTREMITIES: Primary | ICD-10-CM

## 2025-04-29 DIAGNOSIS — M79.602 PARESTHESIA AND PAIN OF BOTH UPPER EXTREMITIES: Primary | ICD-10-CM

## 2025-05-15 ENCOUNTER — DOCUMENTATION ONLY (OUTPATIENT)
Dept: FAMILY MEDICINE | Facility: CLINIC | Age: 35
End: 2025-05-15
Payer: COMMERCIAL

## 2025-07-10 ENCOUNTER — TELEPHONE (OUTPATIENT)
Dept: FAMILY MEDICINE | Facility: CLINIC | Age: 35
End: 2025-07-10
Payer: COMMERCIAL

## 2025-07-10 DIAGNOSIS — E66.09 CLASS 2 OBESITY DUE TO EXCESS CALORIES WITHOUT SERIOUS COMORBIDITY WITH BODY MASS INDEX (BMI) OF 39.0 TO 39.9 IN ADULT: Primary | ICD-10-CM

## 2025-07-10 DIAGNOSIS — E66.812 CLASS 2 OBESITY DUE TO EXCESS CALORIES WITHOUT SERIOUS COMORBIDITY WITH BODY MASS INDEX (BMI) OF 39.0 TO 39.9 IN ADULT: Primary | ICD-10-CM

## 2025-07-10 RX ORDER — SEMAGLUTIDE 0.25 MG/.5ML
0.25 INJECTION, SOLUTION SUBCUTANEOUS
Qty: 2 ML | Refills: 2 | Status: SHIPPED | OUTPATIENT
Start: 2025-07-10 | End: 2025-10-08

## 2025-07-10 NOTE — TELEPHONE ENCOUNTER
Good afternoon patient is asking for an alternative for Zepbound, patient insurance does not cover this medication, please advise thanks !

## 2025-07-11 DIAGNOSIS — E66.812 CLASS 2 OBESITY DUE TO EXCESS CALORIES WITHOUT SERIOUS COMORBIDITY WITH BODY MASS INDEX (BMI) OF 39.0 TO 39.9 IN ADULT: ICD-10-CM

## 2025-07-11 DIAGNOSIS — E66.09 CLASS 2 OBESITY DUE TO EXCESS CALORIES WITHOUT SERIOUS COMORBIDITY WITH BODY MASS INDEX (BMI) OF 39.0 TO 39.9 IN ADULT: ICD-10-CM

## 2025-07-11 RX ORDER — SEMAGLUTIDE 0.25 MG/.5ML
0.25 INJECTION, SOLUTION SUBCUTANEOUS
Qty: 2 ML | Refills: 2 | Status: CANCELLED | OUTPATIENT
Start: 2025-07-11 | End: 2025-10-09

## 2025-08-14 ENCOUNTER — PATIENT MESSAGE (OUTPATIENT)
Dept: FAMILY MEDICINE | Facility: CLINIC | Age: 35
End: 2025-08-14
Payer: COMMERCIAL

## 2025-08-14 ENCOUNTER — TELEPHONE (OUTPATIENT)
Dept: FAMILY MEDICINE | Facility: CLINIC | Age: 35
End: 2025-08-14
Payer: COMMERCIAL